# Patient Record
Sex: FEMALE
[De-identification: names, ages, dates, MRNs, and addresses within clinical notes are randomized per-mention and may not be internally consistent; named-entity substitution may affect disease eponyms.]

---

## 2018-12-14 ENCOUNTER — HOSPITAL ENCOUNTER (OUTPATIENT)
Dept: HOSPITAL 72 - SUR | Age: 33
Discharge: HOME | End: 2018-12-14
Payer: COMMERCIAL

## 2018-12-14 VITALS — DIASTOLIC BLOOD PRESSURE: 47 MMHG | SYSTOLIC BLOOD PRESSURE: 102 MMHG

## 2018-12-14 VITALS — DIASTOLIC BLOOD PRESSURE: 46 MMHG | SYSTOLIC BLOOD PRESSURE: 106 MMHG

## 2018-12-14 VITALS — DIASTOLIC BLOOD PRESSURE: 60 MMHG | SYSTOLIC BLOOD PRESSURE: 108 MMHG

## 2018-12-14 VITALS — SYSTOLIC BLOOD PRESSURE: 126 MMHG | DIASTOLIC BLOOD PRESSURE: 61 MMHG

## 2018-12-14 VITALS — BODY MASS INDEX: 21.71 KG/M2 | HEIGHT: 61 IN | WEIGHT: 115 LBS

## 2018-12-14 VITALS — SYSTOLIC BLOOD PRESSURE: 109 MMHG | DIASTOLIC BLOOD PRESSURE: 50 MMHG

## 2018-12-14 VITALS — DIASTOLIC BLOOD PRESSURE: 65 MMHG | SYSTOLIC BLOOD PRESSURE: 104 MMHG

## 2018-12-14 VITALS — DIASTOLIC BLOOD PRESSURE: 70 MMHG | SYSTOLIC BLOOD PRESSURE: 131 MMHG

## 2018-12-14 VITALS — SYSTOLIC BLOOD PRESSURE: 106 MMHG | DIASTOLIC BLOOD PRESSURE: 71 MMHG

## 2018-12-14 VITALS — DIASTOLIC BLOOD PRESSURE: 49 MMHG | SYSTOLIC BLOOD PRESSURE: 107 MMHG

## 2018-12-14 VITALS — DIASTOLIC BLOOD PRESSURE: 72 MMHG | SYSTOLIC BLOOD PRESSURE: 127 MMHG

## 2018-12-14 DIAGNOSIS — M75.41: ICD-10-CM

## 2018-12-14 DIAGNOSIS — M75.111: Primary | ICD-10-CM

## 2018-12-14 DIAGNOSIS — Z91.018: ICD-10-CM

## 2018-12-14 DIAGNOSIS — M75.21: ICD-10-CM

## 2018-12-14 PROCEDURE — 29823 SHO ARTHRS SRG XTNSV DBRDMT: CPT

## 2018-12-14 PROCEDURE — 94150 VITAL CAPACITY TEST: CPT

## 2018-12-14 PROCEDURE — 29826 SHO ARTHRS SRG DECOMPRESSION: CPT

## 2018-12-14 PROCEDURE — 94003 VENT MGMT INPAT SUBQ DAY: CPT

## 2018-12-14 PROCEDURE — 81025 URINE PREGNANCY TEST: CPT

## 2018-12-14 NOTE — OPERATIVE NOTE - DICTATED
DATE OF OPERATION:  12/14/2018

POSTOPERATIVE DIAGNOSES:

1. Right shoulder partial rotator cuff tear.

2. Right shoulder biceps tendonitis.

3. Impingement syndrome.



POSTOPERATIVE DIAGNOSES:

1. Right shoulder partial rotator cuff tear.

2. Right shoulder biceps tendonitis.

3. Impingement syndrome.



PROCEDURES:

1. Right shoulder diagnostic arthroscopy extensive intra-articular

debridement.

2. Right shoulder subacromial decompression bursectomy release of CA

ligament.



SURGEON:  Mulugeta Gross M.D.



ANESTHESIA:  Interscalene with general.



INDICATION FOR PROCEDURE:  The patient is a pleasant female, who has had

significant right shoulder pain.  She has failed conservative treatment.

She had an MRI, which showed a possible tear of the rotator cuff as well

as possible tenosynovitis along the biceps tendon.  She failed

conservative treatment and elected to undergo right shoulder diagnostic

arthroscopy and possible repair versus debridement of rotator cuff tear

with concurrent decompression bursectomy.  Risks, limitations,

expectations, and complications of the procedure were discussed in detail.

All questions addressed.



DESCRIPTION OF PROCEDURE:  After informed consent was obtained, the patient

was brought to the operating room.  The patient was placed under

interscalene general anesthesia.  The patient was then carefully placed in

beach-chair position.  Right shoulder was prepped and draped in a sterile

manner.  Time-out was performed.  A posterolateral stab incision was then

made.  Trocar was introduced into the glenohumeral joint.  There was no

chondral damage.  The anterior labrum appeared to be intact.  The

subscapularis was intact.  The biceps tendon  had no significant erythema.

No gross subluxation.  The undersurface of the rotator cuff was intact.

 



At this point, the camera was placed in the subacromial space.  There

was hypertrophic bursal tissue making visualization of the undersurface of

the acromion difficult.  The bursectomy was performed.  Once the

undersurface of the acromion was identified, acromioplasty was started

from lateral to medial and then completed from posterior to anterior.

Once that was done, the bursectomy was completed in the posterior axis of

the shoulder.  At that point, the bursal side of the rotator cuff was

evaluated.  It was noted to be intact.  Therefore, instruments removed.

Portal sites were closed with 3-0 Monocryl sutures.  Steri-Strips and

sterile dressing was applied.  The patient was awoken and taken to

recovery room with stable vital signs.



ESTIMATED BLOOD LOSS:  None.



COMPLICATIONS:  None.



SPECIMENS:  None.



IMPLANTS:  None.









  ______________________________________________

  Mulugeta Gross M.D.





DR:  FLACO

D:  12/14/2018 10:36

T:  12/14/2018 20:43

JOB#:  [id number]

CC:

## 2018-12-14 NOTE — ANETHESIA PREOPERATIVE EVAL
Anesthesia Pre-op PMH/ROS


General


Date of Evaluation:  Dec 14, 2018


Time of Evaluation:  09:19


Anesthesiologist:  Nuzhat


ASA Score:  ASA 1


Mallampati Score


Class I : Soft palate, uvula, fauces, pillars visible


Class II: Soft palate, uvula, fauces visible


Class III: Soft palate, base of uvula visible


Class IV: Only hard plate visible


Mallampati Classification:  Class I


Surgeon:  Renato


Diagnosis:  R Shoulder Pain


Surgical Procedure:  R Shoulder Arthroscopy


Anesthesia History:  none


Family History:  no anesthesia problems


Allergies:  


Coded Allergies:  


     Pork (Verified  Allergy, Severe, 18)


 THROAT CLOSES UP


Medications:  see eMAR


Patient NPO?:  Yes





Anesthesia Pre-op Phys. Exam


Physician Exam


Constitutional:  NAD


Neurologic:  CN 2-12 intact


Cardiovascular:  RRR


Respiratory:  CTA


Gastrointestinal:  S/NT/ND





Airway Exam


Mallampati Score:  Class I


MO:  full


ROM:  full


Teeth:  intact





Anesthesia Pre-op A/P


Labs


Urine Pregnancy Test











Test


  18


04:40


 


Urine HCG, Qualitative


  Negative


(NEGATIVE)











Risk Assessment & Plan


Assessment:


ASA 1


Plan:


GA, SED, GlideScope Go


Status Change Before Surgery:  Yes





Pre-Antibiotics


Dru Gram Ancef IV


Given Within 1 Hr of Incision:  Yes


Time Given:  09:46











Quirino Noland MD Dec 14, 2018 09:25

## 2018-12-14 NOTE — PRE-PROCEDURE NOTE/ATTESTATION
Pre-Procedure Note/Attestation


Complete Prior to Procedure


Planned Procedure:  right


Procedure Narrative:


shoulder arthroscopy, sad





Indications for Procedure


Pre-Operative Diagnosis:


right shoulder internal derangment, impingement





Attestation


I attest that I discussed the nature of the procedure; its benefits; risks and 

complications; and alternatives (and the risks and benefits of such alternatives

), prior to the procedure, with the patient (or the patient's legal 

representative).





I attest that, if there was a reasonable possibility of needing a blood 

transfusion, the patient (or the patient's legal representative) was given the 

[facility] standardized written summary, pursuant 

to the [place] Blood Safety Act ([place] Health and Safety Code # 1645, as 

amended).





I attest that I re-evaluated the patient just prior to the surgery and that 

there has been no change in the patient's H&P, except as documented below:











Mulugeta Gross MD Dec 14, 2018 09:55

## 2018-12-14 NOTE — OPERATIVE NOTE - PDOC
Operative Note


Operative Note


Pre-op Diagnosis:


right shoulder internal derangment, impingement


Procedure:


see op report


Post-op Diagnosis:  same as pre-op plus


Operative Findings:  consistent w/pre-op dx studies


Anesthesia:  regional


Specimen:  none


Complications:  none


Condition:  stable


Estimated Blood Loss:  none


Implant(s) used?:  No











Mulugeta Gross MD Dec 14, 2018 09:55

## 2018-12-14 NOTE — 48 HOUR POST ANESTHESIA EVAL
Post Anesthesia Evaluation


Procedure:  R Shoulder Arthroscopy


Date of Evaluation:  Dec 14, 2018


Time of Evaluation:  13:11


Blood Pressure Systolic:  106


0:  53


Pulse Rate:  87


Respiratory Rate:  18


Temperature (Fahrenheit):  98.6


O2 Sat by Pulse Oximetry:  100


Airway:  patent


Nausea:  No


Vomiting:  No


Pain Intensity:  1


Hydration Status:  adequate


Cardiopulmonary Status:


Stable


Mental Status/LOC:  patient returned to baseline


Follow-up Care/Observations:


0


Post-Anesthesia Complications:


0


Follow-up care needed:  ready to discharge











Quirino Noland MD Dec 14, 2018 10:12

## 2018-12-14 NOTE — IMMEDIATE POST-OP EVALUATION
Immediate Post-Op Evalulation


Immediate Post-Op Evalulation


Procedure:  R Shoulder Arthroscopy


Date of Evaluation:  Dec 14, 2018


Time of Evaluation:  11:02


IV Fluids:  800 LR


Blood Products:  0


Estimated Blood Loss:  4


Urinary Output:  0


Blood Pressure Systolic:  107


Blood Pressure Diastolic:  49


Pulse Rate:  86


Respiratory Rate:  16


O2 Sat by Pulse Oximetry:  99


Temperature (Fahrenheit):  99.4


Pain Score (1-10):  1


Nausea:  No


Vomiting:  No


Complications


0


Patient Status:  awake, reacts, patent, none


Hydration Status:  adequate


Dru Gram Ancef IV


Given Within 1 Hr of Incision:  Yes


Time Given:  07:46











Quirino Noland MD Dec 14, 2018 10:06